# Patient Record
Sex: FEMALE | Race: WHITE | Employment: OTHER | URBAN - METROPOLITAN AREA
[De-identification: names, ages, dates, MRNs, and addresses within clinical notes are randomized per-mention and may not be internally consistent; named-entity substitution may affect disease eponyms.]

---

## 2024-07-31 ENCOUNTER — OFFICE VISIT (OUTPATIENT)
Age: 44
End: 2024-07-31

## 2024-07-31 VITALS
OXYGEN SATURATION: 97 % | TEMPERATURE: 98.4 F | WEIGHT: 196.4 LBS | SYSTOLIC BLOOD PRESSURE: 120 MMHG | HEART RATE: 93 BPM | DIASTOLIC BLOOD PRESSURE: 85 MMHG | BODY MASS INDEX: 31.57 KG/M2 | HEIGHT: 66 IN

## 2024-07-31 DIAGNOSIS — R52 BODY ACHES: Primary | ICD-10-CM

## 2024-07-31 DIAGNOSIS — U07.1 COVID: ICD-10-CM

## 2024-07-31 LAB
Lab: ABNORMAL
PERFORMING INSTRUMENT: ABNORMAL
QC PASS/FAIL: ABNORMAL
SARS-COV-2, POC: DETECTED

## 2024-07-31 RX ORDER — LORAZEPAM 1 MG/1
1 TABLET ORAL EVERY 6 HOURS PRN
COMMUNITY

## 2024-07-31 RX ORDER — CITALOPRAM 20 MG/1
20 TABLET ORAL DAILY
COMMUNITY

## 2024-07-31 NOTE — PROGRESS NOTES
Subjective:       History was provided by the patient.  Ale Manuel is a 43 y.o. female who presents for evaluation of symptoms of a URI. Symptoms include dry cough, left ear itching, headache, myalgias, sore throat, and fatigue . Onset of symptoms was 1 day ago, gradually worsening since that time. Associated symptoms include left ear pressure/pain, non productive cough, post nasal drip, sinus pressure, and sore throat.  She is drinking plenty of fluids. Evaluation to date: none. Treatment to date: antihistamines, Acetaminophen, NSAID  Patient's medications, allergies, past medical, surgical, social and family histories were reviewed and updated as appropriate.    Review of Systems  Pertinent items are noted in HPI.      Objective:      General appearance: alert, appears stated age, and cooperative  Ears: normal TM's and external ear canals both ears  Nose: no discharge, turbinates normal, no sinus tenderness  Throat: normal findings: pink and moist   Lungs: clear to auscultation bilaterally  Heart: S1, S2 normal  Lymph nodes: Cervical adenopathy: pink and moist         Assessment:      1. COVID 1. Body aches  - POCT COVID-19, Antigen  Results for orders placed or performed in visit on 07/31/24   POCT COVID-19, Antigen   Result Value Ref Range    SARS-COV-2, POC Detected (A) Not Detected    Lot Number [id number]     QC Pass/Fail Pass     Performing Instrument BinaxNOW        2. COVID  She appears mildly ill, VSS, afebrile, SPO2 97% on room air. No distress noted. Ears normal.  Throat and pharynx normal.  Neck supple. No adenopathy in the neck. Nose is congested. Sinuses non tender. The chest is clear, without wheezes or rales. Covid positive.     Patient is previously healthy and immunocompetent, presenting with symptoms suspicious for likely viral upper respiratory infection.  Differential includes acute bronchitis, rhinosinusitis, allergic rhinitis, bacterial pneumonia, or COVID.  Do not suspect underlying

## 2024-07-31 NOTE — PATIENT INSTRUCTIONS
If symptoms worsens or fail to improve follow-up with PCP or ER.    Thank you for visiting [facility] today    Nasal Congestion:  Flonase or Nasonex (over the counter) nasal spray, once a day  Saline irrigation kits help wash out sinuses 1-2 times a day  Normal saline nasal spray  Cough:  Throat lozenges, hot tea, and honey may help  Vicks VapoRub at night to help with cough and relieve muscles aches and pain  If not prescribed a cough medication, Robitussin DM is an option.  It is an over the counter cough medication containing dextromethorphan to help suppress cough at night   *Please only take when absolutely needed, as this is a controlled substance that can cause addiction   *Please only take cough syrup at nighttime as it causes drowsiness   *Do not drive or operate any machinery while taking this medication  If you have high blood pressure, take Coricidin HBP (or the generic form) instead.  Follow instructions on the box.  Congestion:  For thick mucus, take Mucinex (with Guafenesin only) to help thin the mucus.  Follow instructions on the box.  You will need to drink plenty of water with this medication.  Sore Throat:  Lozenges, as needed. Cepacol lozenges will help numb the throat  Chloraseptic spray also helps to numb throat pain  Salt water gargles to soothe throat pain  Sinus pain/pressure:  Warm, wet towel on face to help with facial sinus pain/pressure  Headache/Pain Fever/Body Aches:  If you can take NSAIDs, take Ibuprofen 400-800mg every 8 hours as needed  If you cannot take NSAIDs, take Tylenol 325-500mg every 6 hours as needed  Miscellanous:  Zyrtec/Xyzal/Allegra/Claritin.  You  may also use the decongestant version of these medications.   Simple foods like chicken noodle soup, smoothies, hot tea with lemon and honey may also help  Avoid smoking  Minimize exposure to irritants    Please follow up with your primary care provider within 2-5 days if your signs and symptoms have not

## 2024-08-09 ENCOUNTER — TELEPHONE (OUTPATIENT)
Age: 44
End: 2024-08-09

## 2024-08-09 NOTE — TELEPHONE ENCOUNTER
Patient was seen last Wednesday and diagnosed with Covid-19, was given paxlovid and finished the medication. Patient wanted to speak to provider due to a continuous headache around crown of head, has taken OTC medication with no relief.

## 2025-02-06 ENCOUNTER — OFFICE VISIT (OUTPATIENT)
Age: 45
End: 2025-02-06

## 2025-02-06 VITALS
TEMPERATURE: 98.5 F | WEIGHT: 200.4 LBS | DIASTOLIC BLOOD PRESSURE: 112 MMHG | HEIGHT: 66 IN | HEART RATE: 76 BPM | BODY MASS INDEX: 32.21 KG/M2 | OXYGEN SATURATION: 98 % | SYSTOLIC BLOOD PRESSURE: 149 MMHG
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
## 2025-02-06 RX ORDER — PROGESTERONE 100 MG/1
CAPSULE ORAL DAILY
COMMUNITY
Start: 2025-01-28

## 2025-02-06 RX ORDER — ESTRADIOL 0.07 MG/D
FILM, EXTENDED RELEASE TRANSDERMAL
COMMUNITY

## 2025-02-06 RX ORDER — BACITRACIN ZINC AND POLYMYXIN B SULFATE 500; 1000 [USP'U]/G; [USP'U]/G
OINTMENT TOPICAL
Qty: 15 G | Refills: 0 | Status: SHIPPED | OUTPATIENT
Start: 2025-02-06 | End: 2025-02-13

## 2025-02-06 NOTE — PATIENT INSTRUCTIONS
If symptoms worsens or fail to improve follow-up with PCP or ER.    Thank you for visiting [facility] today.    -Wash wound with soap and water twice daily  -Place small amount of Bacitracin on wound and cover with non-adhesive dressing  -Ibuprofen/Tylenol for pain  -Apply cool compresses several time a day  -Avoid scratching area    Follow up in 24 hours if symptoms worsen, you develop fever or notice red \"streaks\" from the wound.    If no improvement in 24-48 hours, go to the ER for further evaluation/treatment.

## 2025-02-06 NOTE — PROGRESS NOTES
Ale Manuel (:  1980) is a 44 y.o. female,Established patient, here for evaluation of the following chief complaint(s):  Rash (Rash on right arm, itchy, red, blistering x  night, short of breath)      Assessment & Plan :  Visit Diagnoses and Associated Orders       ORDERS WITHOUT AN ASSOCIATED DIAGNOSIS    estradiol (VIVELLE) 0.075 MG/24HR [92046]      progesterone (PROMETRIUM) 100 MG CAPS capsule [184756]            Below is the assessment and plan developed based on review of history and  physical exam.   1. Bedbug bite, initial encounter  Patient appears well, VSS, afebrile, SPO2 98% on room air. No distress noted. Patient presents with bed bug bites to left arm and forearm.  Patient advised of treatment plan as indicated below, but discussed if symptoms persist or worsen, they will need to follow up with their PCP or a dermatologist to evaluate further. Patient discharged with instructions to go to Emergency Department for sensation of throat closing, facial swelling, difficulty swallowing, difficulty breathing, shortness of breath, chest pain and uncontrolled fever above 100.4F. Patient advised if symptoms fail to improve or worsens to follow-up with  PCP or ER. Patient verbalized understanding, no questions or concerns at this time.    - bacitracin-polymyxin b (POLYSPORIN) 500-21698 UNIT/GM ointment; Apply topically 2 times daily.  Dispense: 15 g; Refill: 0     1. Handout given with care instructions.      2. OTC for symptom management. Increase fluid intake.     3. Follow-up with PCP as needed.     4. Go to ED with development of any acute symptoms.         Follow-up    Follow-up with PCP or ER immediately for any new worsening or changes or if symptoms are not improving over the next 5-7 days.     Subjective :    Rash      44 y.o. female presents with symptoms of rash on right arm x 3 days. Patient states she used topical Benadryl cream and states the rash started weeping and oral